# Patient Record
Sex: MALE | Race: WHITE | URBAN - METROPOLITAN AREA
[De-identification: names, ages, dates, MRNs, and addresses within clinical notes are randomized per-mention and may not be internally consistent; named-entity substitution may affect disease eponyms.]

---

## 2018-01-12 ENCOUNTER — OFFICE VISIT (OUTPATIENT)
Dept: ORTHOPEDIC SURGERY | Age: 48
End: 2018-01-12

## 2018-01-12 VITALS — BODY MASS INDEX: 34.71 KG/M2 | WEIGHT: 300 LBS | HEIGHT: 78 IN

## 2018-01-12 DIAGNOSIS — S83.412A SPRAIN OF MEDIAL COLLATERAL LIGAMENT OF LEFT KNEE, INITIAL ENCOUNTER: Primary | ICD-10-CM

## 2018-01-12 PROCEDURE — G8484 FLU IMMUNIZE NO ADMIN: HCPCS | Performed by: ORTHOPAEDIC SURGERY

## 2018-01-12 PROCEDURE — L1812 KO ELASTIC W/JOINTS PRE OTS: HCPCS | Performed by: ORTHOPAEDIC SURGERY

## 2018-01-12 PROCEDURE — 99203 OFFICE O/P NEW LOW 30 MIN: CPT | Performed by: ORTHOPAEDIC SURGERY

## 2018-01-12 PROCEDURE — G8417 CALC BMI ABV UP PARAM F/U: HCPCS | Performed by: ORTHOPAEDIC SURGERY

## 2018-01-12 PROCEDURE — 1036F TOBACCO NON-USER: CPT | Performed by: ORTHOPAEDIC SURGERY

## 2018-01-12 PROCEDURE — G8427 DOCREV CUR MEDS BY ELIG CLIN: HCPCS | Performed by: ORTHOPAEDIC SURGERY

## 2018-01-24 ENCOUNTER — HOSPITAL ENCOUNTER (OUTPATIENT)
Dept: PHYSICAL THERAPY | Age: 48
Discharge: HOME | End: 2018-01-31
Admitting: ORTHOPAEDIC SURGERY

## 2018-01-24 NOTE — FLOWSHEET NOTE
listed. [] Progression has been slowed due to co-morbidities. [x] Plan just implemented, too soon to assess goals progression  [] Other:     ASSESSMENT:  See eval    Treatment/Activity Tolerance:  [x] Patient tolerated treatment well [] Patient limited by fatique  [] Patient limited by pain  [] Patient limited by other medical complications  [] Other:     Prognosis: [x] Good [] Fair  [] Poor    Patient Requires Follow-up: [x] Yes  [] No    PLAN: See eval  [] Continue per plan of care [] Alter current plan (see comments)  [x] Plan of care initiated [] Hold pending MD visit [] Discharge    *If patient does not return for further follow ups after this date. Please consider this as the patients discharge from physical therapy.      Electronically signed by: Berenice Monroe, Edilia Monroe 1

## 2018-01-24 NOTE — PLAN OF CARE
[facility]  Orthopaedics and Sports Rehabilitation, [facility]  [address], [place], [address]  Phone: [phone]   Fax:     [phone]                                                       Physical Therapy Certification    Dear Referring Practitioner: Dr. Mir Bain,    We had the pleasure of evaluating the following patient for physical therapy services at [address] [place] [address]. A summary of our findings can be found in the initial assessment below. This includes our plan of care. If you have any questions or concerns regarding these findings, please do not hesitate to contact me at the office phone number checked above. Thank you for the referral.       Physician Signature:_______________________________Date:__________________  By signing above (or electronic signature), therapists plan is approved by physician      Patient: Faye Culver   : 1970   MRN: [id number]  Referring Physician: Referring Practitioner: Dr. Mir Bain      Evaluation Date: 2018      Medical Diagnosis Information:  Diagnosis: D68.009Q (ICD-10-CM) - Sprain of medial collateral ligament of left knee, initial encounter   Treatment Diagnosis: Left Knee Pain                                         Insurance information: PT Insurance Information: [facility]     Precautions/ Contra-indications: Previous patellar dislocation. Latex Allergy:  [x]NO      []YES  Preferred Language for Healthcare:   [x]English       []other:    SUBJECTIVE: Patient stated complaint:Slipped and fell on ice 2.5 weeks ago. Patient states onset of pain after fall on medial knee and lateral calf. Patient states most of symptoms feel like stiffness.  After going to the ED patient states he was placed immobilizer and then hinge brace by MD.     Relevant Medical History:none  Functional Disability Index:LEFS:39%    Pain Scale: 5/10  Easing factors: Rest, brace and ice  Provocative factors: sleeping, squatting, stairs, kneeling. Type: []Constant   [x]Intermittent  []Radiating []Localized []other:     Numbness/Tingling: Denied.-     Functional Limitations/Impairments: []Sitting []Standing []Walking    []Squatting/bending  []Stairs           []ADL's  []Transfers []Sports/Recreation []Other:    Occupation/School:. Jogging, lifting. Living Status/Prior Level of Function: Independent with ADLs and IADLs, prior to slip and fall.   (insert highest prior level of function)    OBJECTIVE:     ROM LEFT RIGHT   HIP Flex     HIP Abd     HIP Ext     HIP IR     HIP ER     Knee ext 12 +2   Knee Flex 115 122   Ankle PF     Ankle DF     Ankle In     Ankle Ev     Strength  LEFT RIGHT   HIP Flexors     HIP Abductors     HIP Ext     Hip ER 4+/5 5/5   Knee EXT (quad) 4-/5  Fair Quad Tone 5/5   Knee Flex (HS) 4-/5 5/5   Ankle DF     Ankle PF     Ankle Inv     Ankle EV          Circumference  Mid  7 cm       LE Dermatomes     LE myotomes       Single Leg Squat:    Joint mobility:    []Normal    [x]Hypo   []Hyper    Palpation: TTP peroneals and medial calf. Functional Mobility/Transfers: slow and labored with table and sit to stand    Posture: WFL    Bandages/Dressings/Incisions: None    Gait: (include devices/WB status) Antalgic with Decreased TKE    Orthopedic Special Tests:                       [x] Patient history, allergies, meds reviewed. Medical chart reviewed. See intake form. Review Of Systems (ROS):  [x]Performed Review of systems (Integumentary, CardioPulmonary, Neurological) by intake and observation. Intake form has been scanned into medical record. Patient has been instructed to contact their primary care physician regarding ROS issues if not already being addressed at this time.         Co-morbidities/Complexities (which will affect course of rehabilitation):   [x]None           Arthritic conditions   []Rheumatoid arthritis (M05.9)  []Osteoarthritis (M19.91)   Cardiovascular conditions   []Hypertension (I10)  []Hyperlipidemia (E78.5)  []Angina pectoris (I20)  []Atherosclerosis (I70)   Musculoskeletal conditions   []Disc pathology   []Congenital spine pathologies   []Prior surgical intervention  []Osteoporosis (M81.8)  []Osteopenia (M85.8)   Endocrine conditions   []Hypothyroid (E03.9)  []Hyperthyroid Gastrointestinal conditions   []Constipation (S05.46)   Metabolic conditions   []Morbid obesity (E66.01)  []Diabetes type 1(E10.65) or 2 (E11.65)   []Neuropathy (G60.9)     Pulmonary conditions   []Asthma (J45)  []Coughing   []COPD (J44.9)   Psychological Disorders  []Anxiety (F41.9)  []Depression (F32.9)   []Other:   []Other:          Barriers to/and or personal factors that will affect rehab potential:              []Age  []Sex              []Motivation/Lack of Motivation                        []Co-Morbidities              []Cognitive Function, education/learning barriers              []Environmental, home barriers              []profession/work barriers  []past PT/medical experience  []other:  Justification:     PACEMAKER:  - Denied having a pacemaker that would contraindicate the use of electrical modalities. METAL IMPLANTS:  - Denied metal implants that would contraindicate the use of thermal modalities. CANCER HISTORY:  - Denied a history of cancer that would contraindicate thermal modalities. Falls Risk Assessment (30 days):   [x] Falls Risk assessed and no intervention required. [] Falls Risk assessed and Patient requires intervention due to being higher risk   TUG score (>12s at risk):     [] Falls education provided, including       G-Codes:  PT G-Codes  Functional Assessment Tool Used: LEFS  Score: 39%  Functional Limitation: Mobility: Walking and moving around  Mobility: Walking and Moving Around Current Status ():  At least 20 percent but less than 40 percent impaired, limited or restricted  Mobility: Walking and Moving Around Goal with pathology which may benefit from Dry needling      []other:      Prognosis/Rehab Potential:      []Excellent   [x]Good    []Fair   []Poor    Tolerance of evaluation/treatment:    []Excellent   [x]Good    []Fair   []Poor    Physical Therapy Evaluation Complexity Justification  [x] A history of present problem with:  [x] no personal factors and/or comorbidities that impact the plan of care;  []1-2 personal factors and/or comorbidities that impact the plan of care  []3 personal factors and/or comorbidities that impact the plan of care  [x] An examination of body systems using standardized tests and measures addressing any of the following: body structures and functions (impairments), activity limitations, and/or participation restrictions;:  [] a total of 1-2 or more elements   [x] a total of 3 or more elements   [] a total of 4 or more elements   [x] A clinical presentation with:  [x] stable and/or uncomplicated characteristics   [] evolving clinical presentation with changing characteristics  [] unstable and unpredictable characteristics;   [x] Clinical decision making of [x] low, [] moderate, [] high complexity using standardized patient assessment instrument and/or measurable assessment of functional outcome. [x] EVAL (LOW) 45248 (typically 20 minutes face-to-face)  [] EVAL (MOD) 99164 (typically 30 minutes face-to-face)  [] EVAL (HIGH) 68356 (typically 45 minutes face-to-face)  [] RE-EVAL     PLAN  Frequency/Duration:  2 days per week for 6 Weeks:  Interventions:  1. Therapeutic exercise including: strength training, ROM/flexibility, NMR and proprioception for the proximal hip, core and Lower extremity  2. Manual therapy as indicated including Dry Needling/IASTM, STM, PROM, Gr I-IV mobilizations, spinal mobilization/manipulation. 3. Modalities as needed including: thermal agents, E-stim, US, iontophoresis as indicated. 4. Patient education on joint protection, activity modification, progression of HEP.

## 2018-01-31 ENCOUNTER — HOSPITAL ENCOUNTER (OUTPATIENT)
Dept: PHYSICAL THERAPY | Facility: MEDICAL CENTER | Age: 48
Discharge: HOME | End: 2018-02-01
Admitting: ORTHOPAEDIC SURGERY

## 2018-01-31 NOTE — FLOWSHEET NOTE
The [address] [place] [address] and Sports Rehabilitation, [address], [address]          Phone: [phone]   Fax:     [phone]        Physical Therapy Daily Treatment Note  Date:  2018    Patient Name:  Winsome Russell    :  1970  MRN: [id number]  Restrictions/Precautions:    Medical/Treatment Diagnosis Information:  · Diagnosis: S83.412A (ICD-10-CM) - Sprain of medial collateral ligament of left knee, initial encounter  · Treatment Diagnosis: Left Knee Pain  Insurance/Certification information:  PT Insurance Information: [facility]  Physician Information:  Referring Practitioner: Dr. Denton Montelongos of care signed (Y/N):     Date of Patient follow up with Physician:     G-Code (if applicable):      Date G-Code Applied:         Progress Note: [x]  Yes  []  No  Next due by: Visit #10       Latex Allergy:  [x]NO      []YES  Preferred Language for Healthcare:   [x]English       []other:    Visit # Insurance Allowable   2 25     Pain level:  4/10     SUBJECTIVE:  Pt reports he is a little better but still close to the same.     OBJECTIVE: See eval  Observation:   Test measurements:      RESTRICTIONS/PRECAUTIONS: none, previous patellar dislocations bilaterally >10 years ago    Exercises/Interventions:             Script-3/6/18  Stretching Reps Notes/Last Progression   Bike      Airdyne     Eliptical     Gastroc Stretch  4x30\"    Hamstring Stretch: Tableside 4x30\"    Piriformis Cross Over     Figure 4 Piriformis     Supine ITB      SKC     DKC     Adductor Stretch     Heel Prop/ Prone Hang     Wallslide     SK with SB     BKFO                   Exercises     bike 5'    Quad Sets 20x5\"    SAQ 3x10    SLR Flex 2x20 increased   SLR ABD 2x10 new   SLR Ext     Clamshells- supine gr TB x20 new   Prone [employer] + add squeeze 2x10 new   Ankle Theraband     BAPS     HR/TR     Squats     Education Gait mechancis    Single Leg Balance     EOT Hip Ext/

## 2018-02-01 ENCOUNTER — HOSPITAL ENCOUNTER (OUTPATIENT)
Dept: PHYSICAL THERAPY | Age: 48
Discharge: HOME | End: 2018-02-28
Attending: ORTHOPAEDIC SURGERY | Admitting: ORTHOPAEDIC SURGERY

## 2018-02-02 ENCOUNTER — HOSPITAL ENCOUNTER (OUTPATIENT)
Dept: PHYSICAL THERAPY | Facility: MEDICAL CENTER | Age: 48
Discharge: HOME | End: 2018-02-03
Admitting: ORTHOPAEDIC SURGERY

## 2018-02-02 NOTE — FLOWSHEET NOTE
Standing hamstring curls x30 (L) new   Step up and over 4\" x20  new   EOT Hip Ext/ Donkey Kick                  Manual      Hip Mobs with Belt     Knee Mobs/PROM Grade-     STM to peroneals and medial gastroc and PROM into knee flexion  8'    Ankle Mobs/PROM Grade-         Therapeutic Exercise and NMR EXR  [x] (05056) Provided verbal/tactile cueing for activities related to strengthening, flexibility, endurance, ROM for improvements in LE, proximal hip, and core control with self care, mobility, lifting, ambulation.  [] (07563) Provided verbal/tactile cueing for activities related to improving balance, coordination, kinesthetic sense, posture, motor skill, proprioception  to assist with LE, proximal hip, and core control in self care, mobility, lifting, ambulation and eccentric single leg control.      NMR and Therapeutic Activities:    [] (78743 or 32038) Provided verbal/tactile cueing for activities related to improving balance, coordination, kinesthetic sense, posture, motor skill, proprioception and motor activation to allow for proper function of core, proximal hip and LE with self care and ADLs  [] (06081) Gait Re-education- Provided training and instruction to the patient for proper LE, core and proximal hip recruitment and positioning and eccentric body weight control with ambulation re-education including up and down stairs     Home Exercise Program:    [x] (63055) Reviewed/Progressed HEP activities related to strengthening, flexibility, endurance, ROM of core, proximal hip and LE for functional self-care, mobility, lifting and ambulation/stair navigation   [] (05047)Reviewed/Progressed HEP activities related to improving balance, coordination, kinesthetic sense, posture, motor skill, proprioception of core, proximal hip and LE for self care, mobility, lifting, and ambulation/stair navigation      Manual Treatments:  PROM / STM / Oscillations-Mobs:  G-I, II, III, IV (PA's, Inf., Post.)  [x] (83022) Provided manual therapy to mobilize LE, proximal hip and/or LS spine soft tissue/joints for the purpose of modulating pain, promoting relaxation,  increasing ROM, reducing/eliminating soft tissue swelling/inflammation/restriction, improving soft tissue extensibility and allowing for proper ROM for normal function with self care, mobility, lifting and ambulation. Modalities:  CP x15'     Charges:  Timed Code Treatment Minutes: 48'   Total Treatment Minutes: 61'     [] EVAL (LOW) 75390 (typically 20 minutes face-to-face)  [] EVAL (MOD) 67238 (typically 30 minutes face-to-face)  [] EVAL (HIGH) 23752 (typically 45 minutes face-to-face)  [] RE-EVAL     [x] GS(17109) x  3   [] IONTO  [] NMR (40062) x      [] VASO  [x] Manual (74614) x  1    [] Other:  [] TA x       [] Mech Traction (80153)  [] ES(attended) (38531)      [] ES (un) (49936):     GOALS:   1. Independent in HEP and progression per patient tolerance, in order to prevent re-injury. 2. Patient will have a decrease in pain to facilitate improvement in movement, function, and ADLs as indicated by Functional Deficits. Long Term Goals: To be achieved in: 6 weeks  1. Disability index score of 20% or less for the LEFS to assist with reaching prior level of function. 2. Patient will demonstrate increased AROM to 0-120 knee flex and ext to allow for proper joint functioning as indicated by patients Functional Deficits. 3. Patient will demonstrate an increase in Strength to 4+/5 left knee and hip to allow for proper functional mobility as indicated by patients Functional Deficits. 4. Patient will return to normal ambulation without increased symptoms or restriction. 5. Patient will be able to perform 10 chairs squats to aid in functional mobility. Progression Towards Functional goals:  [] Patient is progressing as expected towards functional goals listed. [] Progression is slowed due to complexities listed.   [] Progression has been slowed due to

## 2018-02-05 ENCOUNTER — HOSPITAL ENCOUNTER (OUTPATIENT)
Dept: PHYSICAL THERAPY | Facility: MEDICAL CENTER | Age: 48
Discharge: HOME | End: 2018-02-06
Admitting: ORTHOPAEDIC SURGERY

## 2018-02-05 NOTE — FLOWSHEET NOTE
Treatments:  PROM / STM / Oscillations-Mobs:  G-I, II, III, IV (PA's, Inf., Post.)  [x] (48390) Provided manual therapy to mobilize LE, proximal hip and/or LS spine soft tissue/joints for the purpose of modulating pain, promoting relaxation,  increasing ROM, reducing/eliminating soft tissue swelling/inflammation/restriction, improving soft tissue extensibility and allowing for proper ROM for normal function with self care, mobility, lifting and ambulation. Modalities:  CP x15'     Charges:  Timed Code Treatment Minutes: 30'   Total Treatment Minutes: 11:02-12:05  61'     [] EVAL (LOW) 55492 (typically 20 minutes face-to-face)  [] EVAL (MOD) 62608 (typically 30 minutes face-to-face)  [] EVAL (HIGH) 82005 (typically 45 minutes face-to-face)  [] RE-EVAL     [x] DY(67090) x  1   [] IONTO  [] NMR (39141) x      [] VASO  [x] Manual (41129) x  1    [] Other:  [] TA x       [] Mech Traction (42132)  [] ES(attended) (76315)      [] ES (un) (36650):     GOALS:   1. Independent in HEP and progression per patient tolerance, in order to prevent re-injury. 2. Patient will have a decrease in pain to facilitate improvement in movement, function, and ADLs as indicated by Functional Deficits. Long Term Goals: To be achieved in: 6 weeks  1. Disability index score of 20% or less for the LEFS to assist with reaching prior level of function. 2. Patient will demonstrate increased AROM to 0-120 knee flex and ext to allow for proper joint functioning as indicated by patients Functional Deficits. 3. Patient will demonstrate an increase in Strength to 4+/5 left knee and hip to allow for proper functional mobility as indicated by patients Functional Deficits. 4. Patient will return to normal ambulation without increased symptoms or restriction. 5. Patient will be able to perform 10 chairs squats to aid in functional mobility.        Progression Towards Functional goals:  [] Patient is progressing as expected towards functional

## 2018-02-07 ENCOUNTER — HOSPITAL ENCOUNTER (OUTPATIENT)
Dept: PHYSICAL THERAPY | Facility: MEDICAL CENTER | Age: 48
Discharge: HOME | End: 2018-02-08
Admitting: ORTHOPAEDIC SURGERY

## 2018-02-07 NOTE — FLOWSHEET NOTE
HR/TR     Squats  Mini 3x10 New- req visual and verbal cues for form    Gait mechanics    Single Leg Balance     TKE with blue band  5\"x30 new   Standing hamstring curls 3# x30 (L) Weight added 2/7   Step up and over 4\"  x20  new   EOT Hip Ext/ Donkey Kick                  Manual      Hip Mobs with Belt     Knee Mobs/PROM Grade-     STM to peroneals and medial gastroc and PROM into knee flexion  8'    Ankle Mobs/PROM Grade-         Therapeutic Exercise and NMR EXR  [x] (35465) Provided verbal/tactile cueing for activities related to strengthening, flexibility, endurance, ROM for improvements in LE, proximal hip, and core control with self care, mobility, lifting, ambulation.  [] (01031) Provided verbal/tactile cueing for activities related to improving balance, coordination, kinesthetic sense, posture, motor skill, proprioception  to assist with LE, proximal hip, and core control in self care, mobility, lifting, ambulation and eccentric single leg control.      NMR and Therapeutic Activities:    [] (89907 or 98319) Provided verbal/tactile cueing for activities related to improving balance, coordination, kinesthetic sense, posture, motor skill, proprioception and motor activation to allow for proper function of core, proximal hip and LE with self care and ADLs  [] (14112) Gait Re-education- Provided training and instruction to the patient for proper LE, core and proximal hip recruitment and positioning and eccentric body weight control with ambulation re-education including up and down stairs     Home Exercise Program:    [x] (77002) Reviewed/Progressed HEP activities related to strengthening, flexibility, endurance, ROM of core, proximal hip and LE for functional self-care, mobility, lifting and ambulation/stair navigation   [] (11307)Reviewed/Progressed HEP activities related to improving balance, coordination, kinesthetic sense, posture, motor skill, proprioception of core, proximal hip and LE for self care,

## 2018-02-12 ENCOUNTER — HOSPITAL ENCOUNTER (OUTPATIENT)
Dept: PHYSICAL THERAPY | Facility: MEDICAL CENTER | Age: 48
Discharge: HOME | End: 2018-02-13
Admitting: ORTHOPAEDIC SURGERY

## 2018-02-12 NOTE — FLOWSHEET NOTE
goals:  [] Patient is progressing as expected towards functional goals listed. [] Progression is slowed due to complexities listed. [] Progression has been slowed due to co-morbidities. [x] Plan just implemented, too soon to assess goals progression  [] Other:     ASSESSMENT:      Treatment/Activity Tolerance:  [x] Patient tolerated treatment well [] Patient limited by fatique  [] Patient limited by pain  [] Patient limited by other medical complications  [x] Other: Slight knee valgus with step downs- corrected with verbal cues. Quad fatigued with progression. Prognosis: [x] Good [] Fair  [] Poor    Patient Requires Follow-up: [x] Yes  [] No    PLAN: See eval  [x] Continue per plan of care [] Alter current plan (see comments)  [] Plan of care initiated [] Hold pending MD visit [] Discharge    *If patient does not return for further follow ups after this date. Please consider this as the patients discharge from physical therapy.      Electronically signed by: Sebastián Ricci, PT,DPT, ATC

## 2018-02-14 ENCOUNTER — HOSPITAL ENCOUNTER (OUTPATIENT)
Dept: PHYSICAL THERAPY | Facility: MEDICAL CENTER | Age: 48
Discharge: HOME | End: 2018-02-15
Admitting: ORTHOPAEDIC SURGERY

## 2018-02-19 ENCOUNTER — HOSPITAL ENCOUNTER (OUTPATIENT)
Dept: PHYSICAL THERAPY | Facility: MEDICAL CENTER | Age: 48
Discharge: HOME | End: 2018-02-20
Admitting: ORTHOPAEDIC SURGERY

## 2018-02-20 NOTE — FLOWSHEET NOTE
improving balance, coordination, kinesthetic sense, posture, motor skill, proprioception of core, proximal hip and LE for self care, mobility, lifting, and ambulation/stair navigation      Manual Treatments:  PROM / STM / Oscillations-Mobs:  G-I, II, III, IV (PA's, Inf., Post.)  [x] (67339) Provided manual therapy to mobilize LE, proximal hip and/or LS spine soft tissue/joints for the purpose of modulating pain, promoting relaxation,  increasing ROM, reducing/eliminating soft tissue swelling/inflammation/restriction, improving soft tissue extensibility and allowing for proper ROM for normal function with self care, mobility, lifting and ambulation. Modalities:  CP x15'     Charges:  Timed Code Treatment Minutes: 61'   Total Treatment Minutes: 3:00-4:08  66'     [] EVAL (LOW) 37088 (typically 20 minutes face-to-face)  [] EVAL (MOD) 55356 (typically 30 minutes face-to-face)  [] EVAL (HIGH) 84915 (typically 45 minutes face-to-face)  [] RE-EVAL     [x] WH(60513) x  3   [] IONTO  [] NMR (81765) x      [] VASO  [x] Manual (59688) x  1    [] Other:   [] TA x       [] Mech Traction (44338)  [] ES(attended) (41307)      [] ES (un) (23077):     GOALS:   1. Independent in HEP and progression per patient tolerance, in order to prevent re-injury. 2. Patient will have a decrease in pain to facilitate improvement in movement, function, and ADLs as indicated by Functional Deficits. Long Term Goals: To be achieved in: 6 weeks  1. Disability index score of 20% or less for the LEFS to assist with reaching prior level of function. 2. Patient will demonstrate increased AROM to 0-120 knee flex and ext to allow for proper joint functioning as indicated by patients Functional Deficits. 3. Patient will demonstrate an increase in Strength to 4+/5 left knee and hip to allow for proper functional mobility as indicated by patients Functional Deficits.    4. Patient will return to normal ambulation without increased symptoms or restriction. 5. Patient will be able to perform 10 chairs squats to aid in functional mobility. Progression Towards Functional goals:  [] Patient is progressing as expected towards functional goals listed. [] Progression is slowed due to complexities listed. [] Progression has been slowed due to co-morbidities. [x] Plan just implemented, too soon to assess goals progression  [] Other:     ASSESSMENT:      Treatment/Activity Tolerance:  [x] Patient tolerated treatment well [] Patient limited by fatique  [] Patient limited by pain  [] Patient limited by other medical complications  [x] Other: Slight knee valgus with step downs- corrected with verbal cues. Quad fatigued with progression. Prognosis: [x] Good [] Fair  [] Poor    Patient Requires Follow-up: [x] Yes  [] No    PLAN: See eval  [x] Continue per plan of care [] Alter current plan (see comments)  [] Plan of care initiated [] Hold pending MD visit [] Discharge    *If patient does not return for further follow ups after this date. Please consider this as the patients discharge from physical therapy.      Electronically signed by:

## 2018-02-21 ENCOUNTER — HOSPITAL ENCOUNTER (OUTPATIENT)
Dept: PHYSICAL THERAPY | Facility: MEDICAL CENTER | Age: 48
Discharge: HOME | End: 2018-02-22
Admitting: ORTHOPAEDIC SURGERY

## 2018-02-26 ENCOUNTER — OFFICE VISIT (OUTPATIENT)
Dept: ORTHOPEDIC SURGERY | Age: 48
End: 2018-02-26

## 2018-02-26 VITALS
BODY MASS INDEX: 34.72 KG/M2 | DIASTOLIC BLOOD PRESSURE: 72 MMHG | WEIGHT: 300.05 LBS | SYSTOLIC BLOOD PRESSURE: 126 MMHG | HEIGHT: 78 IN

## 2018-02-26 DIAGNOSIS — S83.412D SPRAIN OF MEDIAL COLLATERAL LIGAMENT OF LEFT KNEE, SUBSEQUENT ENCOUNTER: Primary | ICD-10-CM

## 2018-02-26 PROCEDURE — G8417 CALC BMI ABV UP PARAM F/U: HCPCS | Performed by: ORTHOPAEDIC SURGERY

## 2018-02-26 PROCEDURE — G8427 DOCREV CUR MEDS BY ELIG CLIN: HCPCS | Performed by: ORTHOPAEDIC SURGERY

## 2018-02-26 PROCEDURE — 1036F TOBACCO NON-USER: CPT | Performed by: ORTHOPAEDIC SURGERY

## 2018-02-26 PROCEDURE — 99213 OFFICE O/P EST LOW 20 MIN: CPT | Performed by: ORTHOPAEDIC SURGERY

## 2018-02-26 PROCEDURE — G8484 FLU IMMUNIZE NO ADMIN: HCPCS | Performed by: ORTHOPAEDIC SURGERY

## 2018-02-26 NOTE — PROGRESS NOTES
Chief Complaint    Follow-up (left knee)      History of Present Illness:  Debra Rubio is a 56 y.o. male. He is here for follow-up for his left knee. He is being treated for a grade 2 MCL sprain. States he feels much better at this point in time. Does continue to get some discomfort over the medial side of the knee with certain activities. He is back to work and has no problem with his knee at work. He has been using his economy hinged knee brace           Medical History:  Patient's medications, allergies, past medical, surgical, social and family histories were reviewed and updated as appropriate. Review of Systems:  Pertinent items are noted in HPI  Review of systems reviewed from Patient History Form dated on 2/26/18 and available in the patient's chart under the Media tab. Vital Signs:  /72   Ht 6' 8\" (2.032 m)   Wt (!) 300 lb 0.7 oz (136.1 kg)   BMI 32.96 kg/m²     General Exam:   Constitutional: Patient is adequately groomed with no evidence of malnutrition  DTRs: Deep tendon reflexes are intact  Mental Status: The patient is oriented to time, place and person. The patient's mood and affect are appropriate. Knee Examination:    Inspection:  No significant swelling erythema noted about the left knee today     Palpation:  no tenderness palpation today about the medial joint line or at the MCL insertion on the medial femoral condyle     Range of Motion:  Extension of the knee is to 0°, knee flexion is 125°     Strength:  He is able to do straight leg raise     Special Tests: There is some opening with valgus stress testing at 30° but does have a firm endpoint. This does still elicit pain.   Negative Lachman exam.  Negative posterior drawer     Skin: There are no rashes, ulcerations or lesions.     Gait: He is walking with an antalgic gait       Additional Comments:       Additional Examinations:         Right Lower Extremity: Examination of the right lower extremity does not show

## 2018-02-27 ENCOUNTER — HOSPITAL ENCOUNTER (OUTPATIENT)
Dept: PHYSICAL THERAPY | Facility: MEDICAL CENTER | Age: 48
Discharge: HOME | End: 2018-02-28
Admitting: ORTHOPAEDIC SURGERY

## 2018-02-27 NOTE — FLOWSHEET NOTE
to aid in functional mobility. Progression Towards Functional goals:  [x] Patient is progressing as expected towards functional goals listed. [] Progression is slowed due to complexities listed. [] Progression has been slowed due to co-morbidities. [] Plan just implemented, too soon to assess goals progression  [] Other:     ASSESSMENT:  Pt required occasional verbal cueing during exercise to correct positioning of hips. Treatment/Activity Tolerance:  [x] Patient tolerated treatment well [] Patient limited by fatique  [] Patient limited by pain  [] Patient limited by other medical complications  [x] Other: Slight knee valgus with step downs- corrected with verbal cues. Quad fatigued with progression. Prognosis: [x] Good [] Fair  [] Poor    Patient Requires Follow-up: [x] Yes  [] No    PLAN: See eval  [x] Continue per plan of care [] Alter current plan (see comments)  [] Plan of care initiated [] Hold pending MD visit [] Discharge    *If patient does not return for further follow ups after this date. Please consider this as the patients discharge from physical therapy.      Electronically signed by: KATHARINE Pinon  Therapist was present, directed the patient's care, made skilled judgement and was responsible for assessment and treatment of the patient

## 2018-03-01 ENCOUNTER — HOSPITAL ENCOUNTER (OUTPATIENT)
Dept: PHYSICAL THERAPY | Facility: MEDICAL CENTER | Age: 48
Discharge: HOME | End: 2018-03-31
Admitting: ORTHOPAEDIC SURGERY

## 2018-03-01 ENCOUNTER — HOSPITAL ENCOUNTER (OUTPATIENT)
Dept: PHYSICAL THERAPY | Age: 48
Discharge: HOME | End: 2018-03-01
Attending: ORTHOPAEDIC SURGERY | Admitting: ORTHOPAEDIC SURGERY

## 2018-03-06 ENCOUNTER — HOSPITAL ENCOUNTER (OUTPATIENT)
Dept: PHYSICAL THERAPY | Facility: MEDICAL CENTER | Age: 48
Discharge: HOME | End: 2018-03-07
Admitting: ORTHOPAEDIC SURGERY

## 2018-03-06 NOTE — PROGRESS NOTES
DOCUMENTATION  I certify that this patient meets one of the below criteria necessary for becoming an exception to the Medicare cap on therapy services:  []  The patient has a condition that has a direct and significant impact on the need for therapy. (Significantly impacts the rate of recovery)  []  The patient has a complexity that has a direct and significant impact on the need for therapy. (Significantly impacts the rate of recovery and is associated with a primary condition.)       []  The patient has associated variables that influence the amount of treatment to include:  Social support, self-efficacy/motivation, prognosis, time since onset/acuity. []  The patient has generalized musculoskeletal conditions or a condition affecting multiple sites that will have a direct impact on the rate of recovery. []  The patient had a prior episode of outpatient therapy during this calendar year for a different condition. []  The patient has a mental or cognitive disorder in addition to the condition being treated that will have a direct and significant impact on the rate of recovery. ASSESSMENT:    Response to Treatment:   - Patient is responding well to treatment and improvement is noted with regards  to goals  - Patient should continue to improve in reasonable time if they continue HEP  - Patient has plateaued and is no longer responding to skilled PT intervention   - Patient is getting worse and would benefit from return to referring MD  - Patient unable to adhere to initial POC    Updated Functional deficiencies/Impairments:     The patient demonstrated at least 15% but less than 17% impairment, limitation or restriction in:   - walking and moving around (mobility)   - changing and maintaining body position  - carrying, moving and handling objects.  - Decreased LE ROM- Reduced overall functional mobility  - Decreased LE functional strength and neuromuscular control- Reduced overall functional mobility   -

## 2018-03-08 ENCOUNTER — HOSPITAL ENCOUNTER (OUTPATIENT)
Dept: PHYSICAL THERAPY | Facility: MEDICAL CENTER | Age: 48
Discharge: HOME | End: 2018-03-09
Admitting: ORTHOPAEDIC SURGERY

## 2018-03-13 ENCOUNTER — HOSPITAL ENCOUNTER (OUTPATIENT)
Dept: PHYSICAL THERAPY | Facility: MEDICAL CENTER | Age: 48
Discharge: HOME | End: 2018-03-14
Admitting: ORTHOPAEDIC SURGERY

## 2018-03-13 NOTE — FLOWSHEET NOTE
skill, proprioception of core, proximal hip and LE for self care, mobility, lifting, and ambulation/stair navigation      Manual Treatments:  PROM / STM / Oscillations-Mobs:  G-I, II, III, IV (PA's, Inf., Post.)  [x] (75939) Provided manual therapy to mobilize LE, proximal hip and/or LS spine soft tissue/joints for the purpose of modulating pain, promoting relaxation,  increasing ROM, reducing/eliminating soft tissue swelling/inflammation/restriction, improving soft tissue extensibility and allowing for proper ROM for normal function with self care, mobility, lifting and ambulation. Modalities:  CP x10'     Charges:  Timed Code Treatment Minutes: 54'   Total Treatment Minutes: 10:59-12:04  72'     [] EVAL (LOW) 19450 (typically 20 minutes face-to-face)  [] EVAL (MOD) 75393 (typically 30 minutes face-to-face)  [] EVAL (HIGH) 95921 (typically 45 minutes face-to-face)  [] RE-EVAL     [x] QJ(53994) x  2   [] IONTO  [] NMR (61462) x      [] VASO  [] Manual (50829) x       [] Other:   [x] TA x  2    [] Mech Traction (30835)  [] ES(attended) (90072)      [] ES (un) (54809):     GOALS:   1. Independent in HEP and progression per patient tolerance, in order to prevent re-injury. 2. Patient will have a decrease in pain to facilitate improvement in movement, function, and ADLs as indicated by Functional Deficits. Long Term Goals: To be achieved in: 6 weeks  1. Disability index score of 20% or less for the LEFS to assist with reaching prior level of function. 2. Patient will demonstrate increased AROM to 0-120 knee flex and ext to allow for proper joint functioning as indicated by patients Functional Deficits. 3. Patient will demonstrate an increase in Strength to 4+/5 left knee and hip to allow for proper functional mobility as indicated by patients Functional Deficits. 4. Patient will return to normal ambulation without increased symptoms or restriction.    5. Patient will be able to perform 10 chairs squats to aid in functional mobility. Progression Towards Functional goals:  [x] Patient is progressing as expected towards functional goals listed. [] Progression is slowed due to complexities listed. [] Progression has been slowed due to co-morbidities. [] Plan just implemented, too soon to assess goals progression  [] Other:     ASSESSMENT:      Treatment/Activity Tolerance:  [x] Patient tolerated treatment well [] Patient limited by fatique  [] Patient limited by pain  [] Patient limited by other medical complications  [x] Other: Pt demonstrated improved quad control but continued to require verbal cues for proper form. Prognosis: [x] Good [] Fair  [] Poor    Patient Requires Follow-up: [x] Yes  [] No    PLAN: See eval  [x] Continue per plan of care [] Alter current plan (see comments)  [] Plan of care initiated [] Hold pending MD visit [] Discharge    *If patient does not return for further follow ups after this date. Please consider this as the patients discharge from physical therapy.      Electronically signed by: Colene Councilman, PT,DPT, ATC

## 2018-03-15 ENCOUNTER — HOSPITAL ENCOUNTER (OUTPATIENT)
Dept: PHYSICAL THERAPY | Facility: MEDICAL CENTER | Age: 48
Discharge: HOME | End: 2018-03-16
Admitting: ORTHOPAEDIC SURGERY

## 2018-03-15 NOTE — FLOWSHEET NOTE
[facility], [address] [facility], [facility] [facility]          Phone: [phone]   Fax:     [phone]        Physical Therapy Daily Treatment Note  Date: 3/15/2018  Patient Name:  Nuris Griffin    :  1970  MRN: [id number]  Restrictions/Precautions:    Medical/Treatment Diagnosis Information:  · Diagnosis: S83.412A (ICD-10-CM) - Sprain of medial collateral ligament of left knee, initial encounter  · Treatment Diagnosis: Left Knee Pain  Insurance/Certification information:  PT Insurance Information: [facility]  Physician Information:  Referring Practitioner: Dr. 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    Exercises/Interventions:             Script-3/6/18      MD- 18  Stretching Reps Notes/Last Progression   Bike      Airdyne     Eliptical     Gastroc Stretch  4x30\"    Hamstring Stretch: Tableside  4x30\"    Piriformis Cross Over     Figure 4 Piriformis     Supine ITB      SKC     DKC     Adductor Stretch     Heel Prop/ Prone Hang     Wallslide     SK with SB     BKFO                   Exercises     bike 5'    SLR Flex 4#, staggered and regul   x15 each Stagger added 2/12 CUES FOR STRAIGHT LEG      SLR Ext     Clamshells- supine blue TB  x30 TB inc 2/12   Prone [place]     Bridge on [place] /triple threats  3x10 SB added 2/12   Lat step down   4\" x20 Added 2/7   Leg press B,  160# x30, 80# x30 Added 2/14   HR/TR   x30 Added 2/14   Squats with yellow ball  Mini 3x10 Ball added 2/14    Gait mechanics    Single Leg Balance floor  3x30\" Added 3/1    5\"x30    Knee flex 50#   Knee extension 50#  x30 (l)  X30  x30 Weight added 2/7  new   Step up and over 8\"  [address] [facility] 3/13   Clam walks  4 laps with blue band     Static lunges  3x10 B                  Manual      Hip Mobs with Belt     Knee Mobs/PROM Grade-          Ankle Mobs/PROM Grade-           5'     *pt performed 15 reps of all interventions to review form and exercises this date    Therapeutic Exercise and NMR EXR  [x] (80877) Provided verbal/tactile cueing for activities related to strengthening, flexibility, endurance, ROM for improvements in LE, proximal hip, and core control with self care, mobility, lifting, ambulation.  [] (73216) Provided verbal/tactile cueing for activities related to improving balance, coordination, kinesthetic sense, posture, motor skill, proprioception  to assist with LE, proximal hip, and core control in self care, mobility, lifting, ambulation and eccentric single leg control.      NMR and Therapeutic Activities:    [] (94157 or 55754) Provided verbal/tactile cueing for activities related to improving balance, coordination, kinesthetic sense, posture, motor skill, proprioception and motor activation to allow for proper function of core, proximal hip and LE with self care and ADLs  [] (69191) Gait Re-education- Provided training and instruction to the patient for proper LE, core and proximal hip recruitment and positioning and eccentric body weight control with ambulation re-education including up and down stairs     Home Exercise Program:    [x] (75529) Reviewed/Progressed HEP activities related to strengthening, flexibility, endurance, ROM of core, proximal hip and LE for functional self-care, mobility, lifting and ambulation/stair navigation   [] (43772)Reviewed/Progressed HEP activities related to improving

## 2018-03-15 NOTE — PROGRESS NOTES
to maximizing \"medical necessity\" of physical / occupational therapy.    [] Discharge independent in a home program as:  [] All goals achieved  [] Maximized \"medical necessity\" of physical / occupational therapy  [] No subjective or objective improvements    Plan: continue therapy pending MD opinion; pt feels he is ready for d/c despite PT discussion on benefits of continued therapy    *consider this as patient's discharge if patient does not return to physical therapy    Electronically signed by: Juany Grover, PT   License #:     Physician Recommendations:  [] Follow treatment plan as above [] Discontinue physical therapy  [] Change plan to: _______________________________________________________________    [] DONNIE [phone])  [] EGO ([phone])  [] AND ([phone])          Fax   [phone]                       Fax  [phone]                  Fax  [phone]              []  Jolanta Case [phone]  [] CBC [phone])  [] POLO ([phone])       Fax   [phone]                   Fax  [phone]                        Fax   [phone]

## 2018-03-26 ENCOUNTER — OFFICE VISIT (OUTPATIENT)
Dept: ORTHOPEDIC SURGERY | Age: 48
End: 2018-03-26

## 2018-03-26 VITALS
SYSTOLIC BLOOD PRESSURE: 129 MMHG | BODY MASS INDEX: 34.72 KG/M2 | HEIGHT: 78 IN | WEIGHT: 300.05 LBS | DIASTOLIC BLOOD PRESSURE: 73 MMHG

## 2018-03-26 DIAGNOSIS — S83.412D SPRAIN OF MEDIAL COLLATERAL LIGAMENT OF LEFT KNEE, SUBSEQUENT ENCOUNTER: Primary | ICD-10-CM

## 2018-03-26 PROCEDURE — 99213 OFFICE O/P EST LOW 20 MIN: CPT | Performed by: ORTHOPAEDIC SURGERY

## 2018-03-26 PROCEDURE — G8417 CALC BMI ABV UP PARAM F/U: HCPCS | Performed by: ORTHOPAEDIC SURGERY

## 2018-03-26 PROCEDURE — 1036F TOBACCO NON-USER: CPT | Performed by: ORTHOPAEDIC SURGERY

## 2018-03-26 PROCEDURE — G8427 DOCREV CUR MEDS BY ELIG CLIN: HCPCS | Performed by: ORTHOPAEDIC SURGERY

## 2018-03-26 PROCEDURE — G8484 FLU IMMUNIZE NO ADMIN: HCPCS | Performed by: ORTHOPAEDIC SURGERY

## 2018-04-01 ENCOUNTER — HOSPITAL ENCOUNTER (OUTPATIENT)
Dept: PHYSICAL THERAPY | Age: 48
Discharge: HOME | End: 2018-04-30
Attending: ORTHOPAEDIC SURGERY | Admitting: ORTHOPAEDIC SURGERY

## 2022-07-23 ENCOUNTER — HOSPITAL ENCOUNTER (EMERGENCY)
Age: 52
Discharge: HOME | End: 2022-07-23
Attending: EMERGENCY MEDICINE
Payer: COMMERCIAL

## 2022-07-23 VITALS
BODY MASS INDEX: 34.71 KG/M2 | OXYGEN SATURATION: 99 % | HEART RATE: 90 BPM | RESPIRATION RATE: 18 BRPM | SYSTOLIC BLOOD PRESSURE: 149 MMHG | TEMPERATURE: 97.4 F | WEIGHT: 300 LBS | HEIGHT: 78 IN | DIASTOLIC BLOOD PRESSURE: 93 MMHG

## 2022-07-23 DIAGNOSIS — H61.23 BILATERAL IMPACTED CERUMEN: Primary | ICD-10-CM

## 2022-07-23 PROCEDURE — 99282 EMERGENCY DEPT VISIT SF MDM: CPT

## 2022-07-23 PROCEDURE — 69209 REMOVE IMPACTED EAR WAX UNI: CPT

## 2022-07-23 ASSESSMENT — PAIN - FUNCTIONAL ASSESSMENT: PAIN_FUNCTIONAL_ASSESSMENT: NONE - DENIES PAIN

## 2022-07-23 NOTE — ED PROVIDER NOTES
Oriented  Best Motor Response: Obeys commands  Rogelio Coma Scale Score: 15          PHYSICAL EXAM    (up to 7 for level 4, 8 or more for level 5)     ED Triage Vitals   BP Temp Temp Source Heart Rate Resp SpO2 Height Weight   07/23/22 1105 07/23/22 1105 07/23/22 1105 07/23/22 1105 07/23/22 1105 07/23/22 1105 07/23/22 1103 07/23/22 1103   (!) 155/103 97.4 °F (36.3 °C) Oral 90 18 99 % 6' 8\" (2.032 m) 300 lb (136.1 kg)       Physical Exam      General appearance:  Cooperative. No acute distress. Skin:  Warm. Dry. Ears, nose, mouth and throat:  Oral mucosa moist, bilateral complete cerumen impaction of the external auditory canal worse on the left when compared to the right. After irrigation, TMs intact bilaterally with positive light reflex and no erythema or effusion  Perfusion:  intact  Respiratory: Respirations nonlabored. Neurological:  Alert. Moves all extremities spontaneously  Musculoskeletal:   Normal ROM, no deformities          Psychiatric:  Normal mood      DIAGNOSTIC RESULTS       Labs Reviewed - No data to display    Interpretation per the Radiologist below, if obtained/available at the time of this note:    No orders to display       All other labs/imaging were within normal range or not returned as of this dictation. EMERGENCY DEPARTMENT COURSE and DIFFERENTIAL DIAGNOSIS/MDM:   Vitals:    Vitals:    07/23/22 1103 07/23/22 1105 07/23/22 1106   BP:  (!) 155/103 (!) 149/93   Pulse:  90    Resp:  18    Temp:  97.4 °F (36.3 °C)    TempSrc:  Oral    SpO2:  99%    Weight: 300 lb (136.1 kg)     Height: 6' 8\" (2.032 m)         Patient presented to the emergency department today with bilateral cerumen impaction. Ears thoroughly irrigated with normal saline by nursing staff and post irrigation repeat exam showed no erythema and an intact tympanic membrane without signs of infection.   Patient feeling much improved with hearing restored and will be discharged home    Rhonda MARTÍNEZ M.D., am the primary clinician of record. MDM      CONSULTS         Critical Care:   None    REASSESSMENT          PROCEDURE     Unless otherwise noted below, none     Procedures      FINAL IMPRESSION      1. Bilateral impacted cerumen            DISPOSITION/PLAN   DISPOSITION Decision To Discharge 07/23/2022 12:02:35 PM        PATIENT REFERRED TO:  Kira Marrero, [address] [place] [id number]  [phone]    Schedule an appointment as soon as possible for a visit       DISCHARGE MEDICATIONS:  New Prescriptions    No medications on file     Controlled Substances Monitoring:     No flowsheet data found.     (Please note that portions of this note were completed with a voice recognition program.  Efforts were made to edit the dictations but occasionally words are mis-transcribed.)    González Canchola MD (electronically signed)  Attending Emergency Physician            Svetlana Sung MD  07/23/22 3402